# Patient Record
(demographics unavailable — no encounter records)

---

## 2024-10-16 NOTE — HISTORY OF PRESENT ILLNESS
[___ Days Post Op] : post op day #[unfilled] [Xray (Date:___)] : [unfilled] Xray -  [Clean/Dry/Intact] : clean, dry and intact [Neuro Intact] : an unremarkable neurological exam [Vascular Intact] : ~T peripheral vascular exam normal [Negative Vera's] : maneuvers demonstrated a negative Vera's sign [Doing Well] : is doing well [Poor Pain Control] : has poor pain control [No Sign of Infection] : is showing no signs of infection [Erythema] : not erythematous [Discharge] : absent of discharge [Swelling] : not swollen [Dehiscence] : not dehisced [de-identified] : POS: Open reduction and internal fixation of left bicondylar tibial plateau fracture.  DOS: 10/07/2024 [de-identified] : Patient is s/p ORIF left tibial plateau fracture. She states she was not able to tolerate oxycodone pills and has been unable to sleep since surgery due to pain. She is also unable to tolerate lovenox injections.  [de-identified] : 2 views of the left knee obtained today show stable hardware s/p tibial plateau fracture ORIF  [de-identified] : Patient to try tramadol for pain and continue taking tylenol PRN. Switch to asa 81mg BID for DVT ppx. PT orders to work on NWB ROM exercises. Follow up in 4-6 weeks.

## 2024-11-20 NOTE — HISTORY OF PRESENT ILLNESS
[___ Weeks Post Op] : [unfilled] weeks post op [Xray (Date:___)] : [unfilled] Xray -  [Clean/Dry/Intact] : clean, dry and intact [Healed] : healed [Erythema] : not erythematous [Discharge] : absent of discharge [Swelling] : swollen [Neuro Intact] : an unremarkable neurological exam [Hardware in Good Position] : hardware in good position [Good Overall Alignment] : good overall alignment [None] : None [de-identified] : POS: Open reduction and internal fixation of left bicondylar tibial plateau fracture.  DOS: 10/07/2024 [de-identified] : Ms. Shaw, six weeks post-operative from a left knee and tibia fracture, reports that she is doing well. She has not yet started physical therapy but has been doing some exercises on her own. She has been able to walk in with crutches for the consultation today. She reports good range of motion in the knee, with the ability to fully straighten it and bend it back well. [de-identified] : 2 view X-ray of left knee and tibia shows good healing with the fracture.  [de-identified] : The patient is exhibiting satisfactory post-operative recovery from a left knee and tibia fracture. The fracture remains well-aligned and the bone graft is stable. The patient's range of motion is within normal limits and she is able to walk without assistance. [de-identified] : The patient is advised to initiate physiotherapy. She is provided with a prescription for physiotherapy and is advised to gradually decrease her dependence on crutches, transitioning from bilateral crutch use to unilateral crutch use, and eventually to no crutches, with the potential use of a cane if necessary. She is advised to contact the clinic if any complications occur.  Follow-up appointment in approximately six weeks. The exact date will be determined based on the office schedule around the holiday season.

## 2024-12-30 NOTE — BEGINNING OF VISIT
Unable to connect - provider and pt connected to two different video feeds.    Was able to reach Crys/Willard via telephone at 12:12 pm. Rescheduled for next Thursday given patient has another appt at 12:30 and we were still having connection issues (bad service in patient's building made telephone not feasible).    Also called patient's mother to let her know about the reschedule.     DHEERAJ Chinchilla, RD, LD  Clinic #: [phone]     [Patient] : patient

## 2024-12-30 NOTE — HISTORY OF PRESENT ILLNESS
[___ Weeks Post Op] : [unfilled] weeks post op [Healed] : healed [Swelling] : swollen [Neuro Intact] : an unremarkable neurological exam [Vascular Intact] : ~T peripheral vascular exam normal [Negative Vera's] : maneuvers demonstrated a negative Vera's sign [Doing Well] : is doing well [No Sign of Infection] : is showing no signs of infection [Adequate Pain Control] : has adequate pain control [Erythema] : not erythematous [Discharge] : absent of discharge [Dehiscence] : not dehisced [de-identified] : s/p: Open reduction and internal fixation of left bicondylar tibial plateau fracture.  DOS: 10/07/2024 [de-identified] : Patient follows up s/p left tibial plateau fracture ORIF 10/7/24. She continues to work with PT on knee extension. She complains of a new swelling and pain to left ankle today.  [de-identified] : 2 views of the left knee obtained today show healing tibial plateau fracture s/p ORIF 3 views of the left ankle obtained today show no acute findings  [de-identified] : Patient continues to improve post operatively. Patient was reassured of no findings on ankle X-Rays today. I recommended she wear a pair of compression socks for swelling. Continue outpatient PT. Follow up in 6 weeks or PRN.

## 2024-12-30 NOTE — END OF VISIT
[FreeTextEntry3] : Orthopaedic Trauma Surgeon Addendum:  I have personally performed a face-to-face diagnostic evaluation on this patient.  I have reviewed the physician assistant note and agree with the history, exam, and plan of care, except as noted.  Raphael Thornton MD Orthopaedic Trauma Surgeon [facility] [place] [Time Spent: ___ minutes] : I have spent [unfilled] minutes of time on the encounter which excludes teaching and separately reported services.